# Patient Record
Sex: MALE | Race: WHITE | NOT HISPANIC OR LATINO | URBAN - METROPOLITAN AREA
[De-identification: names, ages, dates, MRNs, and addresses within clinical notes are randomized per-mention and may not be internally consistent; named-entity substitution may affect disease eponyms.]

---
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
## 2023-03-24 ENCOUNTER — OFFICE (OUTPATIENT)
Dept: URBAN - METROPOLITAN AREA CLINIC 79 | Facility: CLINIC | Age: 23
End: 2023-03-24

## 2023-03-24 VITALS
HEIGHT: 71 IN | DIASTOLIC BLOOD PRESSURE: 85 MMHG | SYSTOLIC BLOOD PRESSURE: 123 MMHG | WEIGHT: 175 LBS | HEART RATE: 93 BPM | TEMPERATURE: 97.3 F

## 2023-03-24 DIAGNOSIS — Z83.79 FAMILY HISTORY OF OTHER DISEASES OF THE DIGESTIVE SYSTEM: ICD-10-CM

## 2023-03-24 DIAGNOSIS — K90.0 CELIAC DISEASE: ICD-10-CM

## 2023-03-24 DIAGNOSIS — K21.9 GASTRO-ESOPHAGEAL REFLUX DISEASE WITHOUT ESOPHAGITIS: ICD-10-CM

## 2023-03-24 DIAGNOSIS — R10.84 GENERALIZED ABDOMINAL PAIN: ICD-10-CM

## 2023-03-24 PROCEDURE — 99204 OFFICE O/P NEW MOD 45 MIN: CPT | Performed by: PHYSICIAN ASSISTANT

## 2023-03-24 NOTE — SERVICEHPINOTES
Pt is here to discuss celiac disease. His mother has celiac disease. Pt had a very + celiac panel in 2018. An EGD was ordered but not completed by the patient. He would like to do this now. He is NOT gluten free. He has reflux  3 days out of the week.  Experiences epigastric pain postprandial 2 times per week. No nausea, vomiting, anorexia, early satiety, etc.  He has regular formed BMs w/o any constipation, diarrhea, or blood in the stool. Wt is stable. No joint pain or rashes. He shows me labs on his phone--no anemia, normal CMP, normal vitamin D and thyroid. TTG IgA  100, ENDOMYIAL ANTIBODY  IgA +, and Deamidated Gliadin ABS IgA is 262. No other GI related complaints today.

## 2023-04-04 LAB
FERRITIN: 78 NG/ML (ref 30–400)
VITAMIN B12 AND FOLATE: FOLATE (FOLIC ACID), SERUM: >20 NG/ML
VITAMIN B12 AND FOLATE: VITAMIN B12: 760 PG/ML (ref 232–1245)

## 2023-04-06 ENCOUNTER — OFFICE (OUTPATIENT)
Dept: URBAN - METROPOLITAN AREA CLINIC 30 | Facility: CLINIC | Age: 23
End: 2023-04-06

## 2023-04-06 VITALS
OXYGEN SATURATION: 100 % | WEIGHT: 175 LBS | HEIGHT: 71 IN | SYSTOLIC BLOOD PRESSURE: 111 MMHG | RESPIRATION RATE: 16 BRPM | RESPIRATION RATE: 14 BRPM | TEMPERATURE: 98.1 F | OXYGEN SATURATION: 97 % | HEART RATE: 75 BPM | DIASTOLIC BLOOD PRESSURE: 66 MMHG | HEART RATE: 85 BPM | SYSTOLIC BLOOD PRESSURE: 114 MMHG | SYSTOLIC BLOOD PRESSURE: 102 MMHG | DIASTOLIC BLOOD PRESSURE: 80 MMHG | SYSTOLIC BLOOD PRESSURE: 126 MMHG | HEART RATE: 71 BPM | DIASTOLIC BLOOD PRESSURE: 76 MMHG | DIASTOLIC BLOOD PRESSURE: 67 MMHG

## 2023-04-06 DIAGNOSIS — K31.89 OTHER DISEASES OF STOMACH AND DUODENUM: ICD-10-CM

## 2023-04-06 DIAGNOSIS — K90.0 CELIAC DISEASE: ICD-10-CM

## 2023-04-06 DIAGNOSIS — K21.9 GASTRO-ESOPHAGEAL REFLUX DISEASE WITHOUT ESOPHAGITIS: ICD-10-CM

## 2023-04-06 DIAGNOSIS — R10.84 GENERALIZED ABDOMINAL PAIN: ICD-10-CM

## 2023-04-28 ENCOUNTER — OFFICE (OUTPATIENT)
Dept: URBAN - METROPOLITAN AREA CLINIC 34 | Facility: CLINIC | Age: 23
End: 2023-04-28

## 2023-04-28 VITALS
SYSTOLIC BLOOD PRESSURE: 104 MMHG | HEART RATE: 74 BPM | DIASTOLIC BLOOD PRESSURE: 72 MMHG | TEMPERATURE: 98 F | WEIGHT: 177 LBS | HEIGHT: 71 IN

## 2023-04-28 DIAGNOSIS — K21.9 GASTRO-ESOPHAGEAL REFLUX DISEASE WITHOUT ESOPHAGITIS: ICD-10-CM

## 2023-04-28 DIAGNOSIS — R10.84 GENERALIZED ABDOMINAL PAIN: ICD-10-CM

## 2023-04-28 DIAGNOSIS — K90.0 CELIAC DISEASE: ICD-10-CM

## 2023-04-28 PROCEDURE — 99214 OFFICE O/P EST MOD 30 MIN: CPT | Performed by: PHYSICIAN ASSISTANT

## 2023-04-28 NOTE — SERVICEHPINOTES
Pt is a 22 yr old male here for f/u to recent EGD for + celiac panel.   Pt had a very + celiac panel in 2018. EGD 4/6/23 revealed celiac disease. Labs revealed no anemia and the following normal tests--CMP, vitamin D, and thyroid. I checked a b12, folate, and ferritin which were unremarkable. He has now been gluten free x 2 weeks. His reflux has already improved--down from 3 days out of the week to 1. Postprandial pain has decreased and is mostly gone. No nausea, vomiting, anorexia, early satiety, etc.  He has regular formed BMs w/o any constipation, diarrhea, or blood in the stool. Wt is stable. No joint pain or rashes. He isn't interested in seeing a dietitian. He is maybe interested in a Dexa scan. His mom has celiac disease so he is well aware of what to cut out of his diet. He is interested in dietary tips. No other GI related complaints today.